# Patient Record
Sex: MALE | Race: WHITE | NOT HISPANIC OR LATINO | URBAN - METROPOLITAN AREA
[De-identification: names, ages, dates, MRNs, and addresses within clinical notes are randomized per-mention and may not be internally consistent; named-entity substitution may affect disease eponyms.]

---

## 2019-04-01 ENCOUNTER — EMERGENCY (EMERGENCY)
Facility: HOSPITAL | Age: 29
LOS: 1 days | End: 2019-04-01
Attending: EMERGENCY MEDICINE
Payer: MEDICAID

## 2019-04-01 VITALS
OXYGEN SATURATION: 99 % | SYSTOLIC BLOOD PRESSURE: 151 MMHG | RESPIRATION RATE: 16 BRPM | DIASTOLIC BLOOD PRESSURE: 84 MMHG | TEMPERATURE: 98 F | HEART RATE: 98 BPM

## 2019-04-01 VITALS — OXYGEN SATURATION: 98 % | HEART RATE: 86 BPM | RESPIRATION RATE: 16 BRPM

## 2019-04-01 LAB
ALBUMIN SERPL ELPH-MCNC: 4.6 G/DL — SIGNIFICANT CHANGE UP (ref 3.3–5)
ALP SERPL-CCNC: 86 U/L — SIGNIFICANT CHANGE UP (ref 40–120)
ALT FLD-CCNC: 31 U/L — SIGNIFICANT CHANGE UP (ref 10–45)
ANION GAP SERPL CALC-SCNC: 15 MMOL/L — SIGNIFICANT CHANGE UP (ref 5–17)
AST SERPL-CCNC: 40 U/L — SIGNIFICANT CHANGE UP (ref 10–40)
BASOPHILS # BLD AUTO: 0.1 K/UL — SIGNIFICANT CHANGE UP (ref 0–0.2)
BASOPHILS NFR BLD AUTO: 0.5 % — SIGNIFICANT CHANGE UP (ref 0–2)
BILIRUB SERPL-MCNC: 0.3 MG/DL — SIGNIFICANT CHANGE UP (ref 0.2–1.2)
BUN SERPL-MCNC: 22 MG/DL — SIGNIFICANT CHANGE UP (ref 7–23)
CALCIUM SERPL-MCNC: 9.5 MG/DL — SIGNIFICANT CHANGE UP (ref 8.4–10.5)
CHLORIDE SERPL-SCNC: 102 MMOL/L — SIGNIFICANT CHANGE UP (ref 96–108)
CO2 SERPL-SCNC: 24 MMOL/L — SIGNIFICANT CHANGE UP (ref 22–31)
CREAT SERPL-MCNC: 1.08 MG/DL — SIGNIFICANT CHANGE UP (ref 0.5–1.3)
EOSINOPHIL # BLD AUTO: 0.3 K/UL — SIGNIFICANT CHANGE UP (ref 0–0.5)
EOSINOPHIL NFR BLD AUTO: 2.8 % — SIGNIFICANT CHANGE UP (ref 0–6)
GLUCOSE SERPL-MCNC: 94 MG/DL — SIGNIFICANT CHANGE UP (ref 70–99)
HCT VFR BLD CALC: 51 % — HIGH (ref 39–50)
HGB BLD-MCNC: 17.5 G/DL — HIGH (ref 13–17)
LYMPHOCYTES # BLD AUTO: 2.5 K/UL — SIGNIFICANT CHANGE UP (ref 1–3.3)
LYMPHOCYTES # BLD AUTO: 24.5 % — SIGNIFICANT CHANGE UP (ref 13–44)
MAGNESIUM SERPL-MCNC: 2.4 MG/DL — SIGNIFICANT CHANGE UP (ref 1.6–2.6)
MCHC RBC-ENTMCNC: 31.3 PG — SIGNIFICANT CHANGE UP (ref 27–34)
MCHC RBC-ENTMCNC: 34.3 GM/DL — SIGNIFICANT CHANGE UP (ref 32–36)
MCV RBC AUTO: 91.3 FL — SIGNIFICANT CHANGE UP (ref 80–100)
MONOCYTES # BLD AUTO: 0.8 K/UL — SIGNIFICANT CHANGE UP (ref 0–0.9)
MONOCYTES NFR BLD AUTO: 8.1 % — SIGNIFICANT CHANGE UP (ref 2–14)
NEUTROPHILS # BLD AUTO: 6.6 K/UL — SIGNIFICANT CHANGE UP (ref 1.8–7.4)
NEUTROPHILS NFR BLD AUTO: 64.1 % — SIGNIFICANT CHANGE UP (ref 43–77)
PHOSPHATE SERPL-MCNC: 3.5 MG/DL — SIGNIFICANT CHANGE UP (ref 2.5–4.5)
PLATELET # BLD AUTO: 219 K/UL — SIGNIFICANT CHANGE UP (ref 150–400)
POTASSIUM SERPL-MCNC: 4.2 MMOL/L — SIGNIFICANT CHANGE UP (ref 3.5–5.3)
POTASSIUM SERPL-SCNC: 4.2 MMOL/L — SIGNIFICANT CHANGE UP (ref 3.5–5.3)
PROT SERPL-MCNC: 8.4 G/DL — HIGH (ref 6–8.3)
RBC # BLD: 5.58 M/UL — SIGNIFICANT CHANGE UP (ref 4.2–5.8)
RBC # FLD: 11.6 % — SIGNIFICANT CHANGE UP (ref 10.3–14.5)
SODIUM SERPL-SCNC: 141 MMOL/L — SIGNIFICANT CHANGE UP (ref 135–145)
TSH SERPL-MCNC: 2.2 UIU/ML — SIGNIFICANT CHANGE UP (ref 0.27–4.2)
WBC # BLD: 10.4 K/UL — SIGNIFICANT CHANGE UP (ref 3.8–10.5)
WBC # FLD AUTO: 10.4 K/UL — SIGNIFICANT CHANGE UP (ref 3.8–10.5)

## 2019-04-01 PROCEDURE — 85027 COMPLETE CBC AUTOMATED: CPT

## 2019-04-01 PROCEDURE — 93005 ELECTROCARDIOGRAM TRACING: CPT

## 2019-04-01 PROCEDURE — 83735 ASSAY OF MAGNESIUM: CPT

## 2019-04-01 PROCEDURE — 99284 EMERGENCY DEPT VISIT MOD MDM: CPT | Mod: 25

## 2019-04-01 PROCEDURE — 84100 ASSAY OF PHOSPHORUS: CPT

## 2019-04-01 PROCEDURE — 93010 ELECTROCARDIOGRAM REPORT: CPT

## 2019-04-01 PROCEDURE — 71046 X-RAY EXAM CHEST 2 VIEWS: CPT

## 2019-04-01 PROCEDURE — 84443 ASSAY THYROID STIM HORMONE: CPT

## 2019-04-01 PROCEDURE — 80053 COMPREHEN METABOLIC PANEL: CPT

## 2019-04-01 PROCEDURE — 71046 X-RAY EXAM CHEST 2 VIEWS: CPT | Mod: 26

## 2019-04-01 NOTE — ED STATDOCS - OBJECTIVE STATEMENT
28yom no pmhx, here with palpitations, pt reports extensive workup in the past with echo, stress test, holter with no significance results. pt reports feeling skipping a beat or missing a beat.

## 2019-04-01 NOTE — ED ADULT NURSE REASSESSMENT NOTE - NS ED NURSE REASSESS COMMENT FT1
Patient discharged from ED. Discharge paperwork provided. Verbalized understanding of teaching. Vital signs stable, afebrile. Patient leaving unit ambulatory, free from harm.

## 2019-04-01 NOTE — ED PROVIDER NOTE - PROGRESS NOTE DETAILS
initial EKG for patient showing PVCs consistent with bigeminy pattern. Now patient is asymptomatic and repeat EKG shows normal sinus rhythm without any PVCs at rate of 78bpm. advised patient on normalization of ekg and need for follow-up with [facility] for longer period of Holter monitoring and reevaluation. Patient states that he can get a follow-up tomorrow with his cardiologist. copies of EKG provided to patient to take to appointment. Pending CXR at this time if normal patient will be stable for d/c for follow-up with cardiology. -Sammie Sweeney PA-C

## 2019-04-01 NOTE — ED ADULT TRIAGE NOTE - CHIEF COMPLAINT QUOTE
hx of palpitations and today when he checked, HR was 45 and having persistent palpitations since 12pm  last stress and echo done was one year ago and was told he has "normal palpitations"

## 2019-04-01 NOTE — ED ADULT NURSE NOTE - OBJECTIVE STATEMENT
pt states onset of palpitations at 11am this morning and lasted until 2:30pm pt presents to CDU area at 1740 from triage area pt states palpitaions has resolved at this time hx of previous palpitations and has had work up pt placed on portable cardiac moniter on arrival

## 2019-04-01 NOTE — ED ADULT NURSE REASSESSMENT NOTE - NS ED NURSE REASSESS COMMENT FT1
Patient taken to Xray. Reports "I feel no symptoms" Denies chest pain, palpitations, abdomen pain, n/v/d. Patient well appearing. a/ox3, will reassess when patient returns.

## 2019-04-01 NOTE — ED PROVIDER NOTE - CLINICAL SUMMARY MEDICAL DECISION MAKING FREE TEXT BOX
29 y/o M pmhx palpitations chronically, always lasting a few seconds to a few minutes, presenting to ED today with episode that lasted from 1130AM to 3PM concerning him with how long they lasted. No associated symptoms. Palpitations resolved upon evaluation. Labs resulted showing mild hemoconcentration otherwise normal. Initial EKG in triage revealing PVCs with bigeminy, now asymptomatic with EKG NSR at 78bpm. Lengthy work-up obtained as outpatient with normal TTE, stress test and echo in past. Will obtain CXR and discuss need for repeat Holter monitoring with cardiologist for further evaluation and strict return precautions. 27 y/o M pmhx palpitations chronically, always lasting a few seconds to a few minutes, presenting to ED today with episode that lasted from 1130AM to 3PM concerning him with how long they lasted. No associated symptoms. Palpitations resolved upon evaluation. Labs resulted showing mild hemoconcentration otherwise normal. Initial EKG in triage revealing PVCs with bigeminy, now asymptomatic with EKG NSR at 78bpm. Lengthy work-up obtained as outpatient with normal TTE, stress test and echo in past. Will obtain CXR and discuss need for repeat Holter monitoring with cardiologist for further evaluation and strict return precautions.  Andres: Patient with palpitations on and off in the past, today with palpitations for longer period of time. upon arrival in ER, EKG showed bigemny, symptoms resolved and now with NSR on ekg. labs and electrolytes wnl. no symptoms at this time. has cards f/u in am. no cp, no sob, no dizziness. no change in diet, no excess caffeine. will add on tsh, cxr. d/c home to f/u.

## 2019-04-01 NOTE — ED PROVIDER NOTE - OBJECTIVE STATEMENT
29 y/o M pmhx palpitations throughout his life, presenting with episode of palpitations today lasting >3 hours. Pt states that in the past his palpitations have always only lasted a few minutes or seconds, and today when they lasted for several hours he became concerned. States that he has had a workup approximately 2 years ago for his palpitations, consisting of an echo, a stress test and an EKG that were all within normal limits. He states that he had a holter monitor as well which did not show any abnormalities but he does not know if he wore it for more than a day. He states that his palpitations started approximately 1130AM and lasted until approximately 3PM. Reports he was still experiencing them when he was in the waiting room but reports they have been resolved for approximately 2 hours at this time. Denies any symptoms of chest pain, shortness of breath, nausea, vomiting, sweating, lightheadedness, dizziness with his palpitations. Denies any recent illnesses, travel, increased or any caffeine intake, supplements or other changes to his baseline intake.   Cardiologist: Karolina

## 2019-04-01 NOTE — ED PROVIDER NOTE - NS ED ROS FT
Constitutional: No fever or chills  Eyes: No visual changes, eye pain or redness  HEENT: No throat pain, ear pain, nasal pain. No nose bleeding.  CV: No chest pain or lower extremity edema. +palpitations, resolved.   Resp: No SOB no cough  GI: No abd pain. No nausea or vomiting. No diarrhea. No constipation.   : No dysuria, hematuria.   MSK: No musculoskeletal pain  Skin: No rash  Neuro: No headache. No numbness or tingling. No weakness.

## 2019-04-01 NOTE — ED PROVIDER NOTE - NSFOLLOWUPINSTRUCTIONS_ED_ALL_ED_FT
1. Follow-up with your cardiologist tomorrow for continued evaluation and to start Holter monitoring for longer period. Bring copies of EKGs provided to you   2. Hydrate yourself well throughout the day   3. Return to the ER for any new or worsening symptoms

## 2019-12-05 NOTE — ED ADULT NURSE NOTE - PRO INTERPRETER NEED 2
English
[FreeTextEntry1] : patient likely has UTI, will start antibiotics prophylactically and will adjust as needed when urine culture results return \par \par In regards to patient's back pain with urinary symptoms, will order US renal and call patient with results. \par \par Counseling included abnormal lab results, differential diagnoses, treatment options, risks and benefits, lifestyle changes, prognosis, current condition, medications, and dose adjustments. \par The patient was interactive, attentive, asked questions, and verbalized understanding

## 2020-01-14 PROBLEM — Z78.9 OTHER SPECIFIED HEALTH STATUS: Chronic | Status: ACTIVE | Noted: 2019-04-01

## 2020-01-15 DIAGNOSIS — L05.91 PILONIDAL CYST W/OUT ABSCESS: ICD-10-CM

## 2020-01-16 ENCOUNTER — APPOINTMENT (OUTPATIENT)
Dept: SURGERY | Facility: CLINIC | Age: 30
End: 2020-01-16

## 2020-01-23 ENCOUNTER — APPOINTMENT (OUTPATIENT)
Dept: SURGERY | Facility: CLINIC | Age: 30
End: 2020-01-23
Payer: MEDICAID

## 2020-01-23 ENCOUNTER — APPOINTMENT (OUTPATIENT)
Dept: SURGERY | Facility: CLINIC | Age: 30
End: 2020-01-23

## 2020-01-23 VITALS
HEIGHT: 74 IN | BODY MASS INDEX: 28.49 KG/M2 | SYSTOLIC BLOOD PRESSURE: 132 MMHG | HEART RATE: 90 BPM | RESPIRATION RATE: 16 BRPM | OXYGEN SATURATION: 98 % | WEIGHT: 222 LBS | TEMPERATURE: 99.3 F | DIASTOLIC BLOOD PRESSURE: 81 MMHG

## 2020-01-23 VITALS
RESPIRATION RATE: 16 BRPM | DIASTOLIC BLOOD PRESSURE: 81 MMHG | HEART RATE: 90 BPM | BODY MASS INDEX: 28.49 KG/M2 | WEIGHT: 222 LBS | TEMPERATURE: 99.3 F | HEIGHT: 74 IN | SYSTOLIC BLOOD PRESSURE: 132 MMHG | OXYGEN SATURATION: 98 %

## 2020-01-23 DIAGNOSIS — Z98.890 OTHER SPECIFIED POSTPROCEDURAL STATES: ICD-10-CM

## 2020-01-23 DIAGNOSIS — K60.3 ANAL FISTULA: ICD-10-CM

## 2020-01-23 DIAGNOSIS — Z78.9 OTHER SPECIFIED HEALTH STATUS: ICD-10-CM

## 2020-01-23 PROCEDURE — 99203 OFFICE O/P NEW LOW 30 MIN: CPT | Mod: 25

## 2020-01-23 PROCEDURE — 46600 DIAGNOSTIC ANOSCOPY SPX: CPT

## 2020-01-23 NOTE — ASSESSMENT
[FreeTextEntry1] : I have seen and evaluated patient and I have corroborated all nursing input into this note. Although patient has multiple pilonidal cysts in the intergluteal cleft, his open draining wound appears to extend towards the posterior anal canal. 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 I recommended an MRI to further define the tract. Pending the MRI I will most likely proceed with colonoscopy to rule out inflammatory bowel disease. Indications, risks, benefits, alternatives reviewed including but not limited to bleeding, perforation, and incomplete exam. If a deep transsphincteric fistula is identified and there is no evidence of Crohn's disease then a staged sphincter sparing procedure will be needed to treat the fistula. Unfortunately, there is a trade off between continence and success rate with surgery for deep transsphincteric anal fistulas. I informed the patient that the sphincter sparing procedures are generally associated with a 70-80% success rate and a 5% rate of incontinence of flatus and stool staining.

## 2020-01-23 NOTE — HISTORY OF PRESENT ILLNESS
[FreeTextEntry1] : The patient reports recurrent drainage and pain near his tailbone that started one year ago. He has had it incised and drained by his dermatologist multiple times. He reports normal formed BMs daily. Denies abdominal cramping and/or diarrhea.

## 2020-01-23 NOTE — PHYSICAL EXAM
[Normal Breath Sounds] : Normal breath sounds [Normal Heart Sounds] : normal heart sounds [Normal Rate and Rhythm] : normal rate and rhythm [No Edema] : No edema [No Rash or Lesion] : No rash or lesion [Alert] : alert [Oriented to Person] : oriented to person [Oriented to Place] : oriented to place [Oriented to Time] : oriented to time [Calm] : calm [Abdomen Masses] : No abdominal masses [Abdomen Tenderness] : ~T No ~M abdominal tenderness [JVD] : no jugular venous distention  [Wheezing] : no wheezing was heard [de-identified] : Appears well nourished [de-identified] : Full ROM [de-identified] : WNL [FreeTextEntry1] : Perianal inspection demonstrated multiple pilonidal cyst openings in the intergluteal cleft. There was an open draining wound over the left posterior ischiorectal fossa. Probing of the wound extended toward the anus. There was no palpable superficial fistula tract. Digital and anoscopic evaluation unremarkable.

## 2020-01-23 NOTE — CONSULT LETTER
[Dear  ___] : Dear ~JEAN-CLAUDE, [Courtesy Letter:] : I had the pleasure of seeing your patient, [unfilled], in my office today. [Please see my note below.] : Please see my note below. [Consult Closing:] : Thank you very much for allowing me to participate in the care of this patient.  If you have any questions, please do not hesitate to contact me. [Sincerely,] : Sincerely, [DrRitesh  ___] : Dr. PEDERSEN [FreeTextEntry2] : Dr. Marko Martins [FreeTextEntry3] : Rob Walden M.D., JERRY.GENNA., F.BOUCHRA.S.PITERRRiteshS.\[facility] Chief Colorectal Clinical Services, [facility]

## 2020-01-26 ENCOUNTER — FORM ENCOUNTER (OUTPATIENT)
Age: 30
End: 2020-01-26

## 2020-01-27 ENCOUNTER — APPOINTMENT (OUTPATIENT)
Dept: MRI IMAGING | Facility: IMAGING CENTER | Age: 30
End: 2020-01-27
Payer: MEDICAID

## 2020-01-27 ENCOUNTER — OUTPATIENT (OUTPATIENT)
Dept: OUTPATIENT SERVICES | Facility: HOSPITAL | Age: 30
LOS: 1 days | End: 2020-01-27
Payer: MEDICAID

## 2020-01-27 DIAGNOSIS — K60.3 ANAL FISTULA: ICD-10-CM

## 2020-01-27 PROCEDURE — A9585: CPT

## 2020-01-27 PROCEDURE — 72197 MRI PELVIS W/O & W/DYE: CPT

## 2020-01-27 PROCEDURE — 72197 MRI PELVIS W/O & W/DYE: CPT | Mod: 26

## 2020-12-28 NOTE — ED ADULT NURSE NOTE - CAS TRG GEN SKIN CONDITION
Detail Level: Generalized Warm Action 2: Continue Sig For Treatment 1 (If Needed): twice daily Action 1: Start Treatment 1: Betamethasone diproprionate ointment

## 2022-04-02 ENCOUNTER — EMERGENCY (EMERGENCY)
Facility: HOSPITAL | Age: 32
LOS: 1 days | Discharge: HOME | End: 2022-04-02
Attending: EMERGENCY MEDICINE
Payer: COMMERCIAL

## 2022-04-02 VITALS
HEART RATE: 115 BPM | DIASTOLIC BLOOD PRESSURE: 69 MMHG | TEMPERATURE: 98 F | SYSTOLIC BLOOD PRESSURE: 110 MMHG | OXYGEN SATURATION: 100 % | WEIGHT: 205.03 LBS | HEIGHT: 74 IN | RESPIRATION RATE: 26 BRPM

## 2022-04-02 VITALS
OXYGEN SATURATION: 97 % | SYSTOLIC BLOOD PRESSURE: 118 MMHG | RESPIRATION RATE: 20 BRPM | HEART RATE: 106 BPM | TEMPERATURE: 101 F | DIASTOLIC BLOOD PRESSURE: 64 MMHG

## 2022-04-02 LAB
ALBUMIN SERPL ELPH-MCNC: 4.7 G/DL — SIGNIFICANT CHANGE UP (ref 3.3–5)
ALP SERPL-CCNC: 78 U/L — SIGNIFICANT CHANGE UP (ref 40–120)
ALT FLD-CCNC: 35 U/L — SIGNIFICANT CHANGE UP (ref 10–45)
ANION GAP SERPL CALC-SCNC: 13 MMOL/L — SIGNIFICANT CHANGE UP (ref 5–17)
AST SERPL-CCNC: 40 U/L — SIGNIFICANT CHANGE UP (ref 10–40)
BASOPHILS # BLD AUTO: 0 K/UL — SIGNIFICANT CHANGE UP (ref 0–0.2)
BASOPHILS NFR BLD AUTO: 0 % — SIGNIFICANT CHANGE UP (ref 0–2)
BILIRUB SERPL-MCNC: 0.7 MG/DL — SIGNIFICANT CHANGE UP (ref 0.2–1.2)
BUN SERPL-MCNC: 25 MG/DL — HIGH (ref 7–23)
CALCIUM SERPL-MCNC: 9.5 MG/DL — SIGNIFICANT CHANGE UP (ref 8.4–10.5)
CHLORIDE SERPL-SCNC: 100 MMOL/L — SIGNIFICANT CHANGE UP (ref 96–108)
CO2 SERPL-SCNC: 25 MMOL/L — SIGNIFICANT CHANGE UP (ref 22–31)
CREAT SERPL-MCNC: 1.19 MG/DL — SIGNIFICANT CHANGE UP (ref 0.5–1.3)
EGFR: 83 ML/MIN/1.73M2 — SIGNIFICANT CHANGE UP
EOSINOPHIL # BLD AUTO: 0.16 K/UL — SIGNIFICANT CHANGE UP (ref 0–0.5)
EOSINOPHIL NFR BLD AUTO: 1.7 % — SIGNIFICANT CHANGE UP (ref 0–6)
GLUCOSE SERPL-MCNC: 133 MG/DL — HIGH (ref 70–99)
HCT VFR BLD CALC: 52.9 % — HIGH (ref 39–50)
HGB BLD-MCNC: 18.5 G/DL — HIGH (ref 13–17)
LIDOCAIN IGE QN: 61 U/L — HIGH (ref 7–60)
LYMPHOCYTES # BLD AUTO: 0.25 K/UL — LOW (ref 1–3.3)
LYMPHOCYTES # BLD AUTO: 2.6 % — LOW (ref 13–44)
MANUAL SMEAR VERIFICATION: SIGNIFICANT CHANGE UP
MCHC RBC-ENTMCNC: 29.8 PG — SIGNIFICANT CHANGE UP (ref 27–34)
MCHC RBC-ENTMCNC: 35 GM/DL — SIGNIFICANT CHANGE UP (ref 32–36)
MCV RBC AUTO: 85.3 FL — SIGNIFICANT CHANGE UP (ref 80–100)
MONOCYTES # BLD AUTO: 0.99 K/UL — HIGH (ref 0–0.9)
MONOCYTES NFR BLD AUTO: 10.2 % — SIGNIFICANT CHANGE UP (ref 2–14)
NEUTROPHILS # BLD AUTO: 8.05 K/UL — HIGH (ref 1.8–7.4)
NEUTROPHILS NFR BLD AUTO: 75.4 % — SIGNIFICANT CHANGE UP (ref 43–77)
NEUTS BAND # BLD: 7.6 % — SIGNIFICANT CHANGE UP (ref 0–8)
PLAT MORPH BLD: NORMAL — SIGNIFICANT CHANGE UP
PLATELET # BLD AUTO: 165 K/UL — SIGNIFICANT CHANGE UP (ref 150–400)
POTASSIUM SERPL-MCNC: 4.1 MMOL/L — SIGNIFICANT CHANGE UP (ref 3.5–5.3)
POTASSIUM SERPL-SCNC: 4.1 MMOL/L — SIGNIFICANT CHANGE UP (ref 3.5–5.3)
PROT SERPL-MCNC: 8 G/DL — SIGNIFICANT CHANGE UP (ref 6–8.3)
RBC # BLD: 6.2 M/UL — HIGH (ref 4.2–5.8)
RBC # FLD: 11.9 % — SIGNIFICANT CHANGE UP (ref 10.3–14.5)
RBC BLD AUTO: NORMAL — SIGNIFICANT CHANGE UP
SODIUM SERPL-SCNC: 138 MMOL/L — SIGNIFICANT CHANGE UP (ref 135–145)
VARIANT LYMPHS # BLD: 2.5 % — SIGNIFICANT CHANGE UP (ref 0–6)
WBC # BLD: 9.7 K/UL — SIGNIFICANT CHANGE UP (ref 3.8–10.5)
WBC # FLD AUTO: 9.7 K/UL — SIGNIFICANT CHANGE UP (ref 3.8–10.5)

## 2022-04-02 PROCEDURE — 99284 EMERGENCY DEPT VISIT MOD MDM: CPT

## 2022-04-02 RX ORDER — ONDANSETRON 8 MG/1
4 TABLET, FILM COATED ORAL ONCE
Refills: 0 | Status: COMPLETED | OUTPATIENT
Start: 2022-04-02 | End: 2022-04-02

## 2022-04-02 RX ORDER — ACETAMINOPHEN 500 MG
650 TABLET ORAL ONCE
Refills: 0 | Status: COMPLETED | OUTPATIENT
Start: 2022-04-02 | End: 2022-04-02

## 2022-04-02 RX ORDER — SODIUM CHLORIDE 9 MG/ML
1000 INJECTION INTRAMUSCULAR; INTRAVENOUS; SUBCUTANEOUS ONCE
Refills: 0 | Status: COMPLETED | OUTPATIENT
Start: 2022-04-02 | End: 2022-04-02

## 2022-04-02 RX ORDER — FAMOTIDINE 10 MG/ML
20 INJECTION INTRAVENOUS ONCE
Refills: 0 | Status: COMPLETED | OUTPATIENT
Start: 2022-04-02 | End: 2022-04-02

## 2022-04-02 RX ORDER — ONDANSETRON 8 MG/1
1 TABLET, FILM COATED ORAL
Qty: 9 | Refills: 0
Start: 2022-04-02 | End: 2022-04-04

## 2022-04-02 RX ADMIN — ONDANSETRON 4 MILLIGRAM(S): 8 TABLET, FILM COATED ORAL at 21:40

## 2022-04-02 RX ADMIN — SODIUM CHLORIDE 1000 MILLILITER(S): 9 INJECTION INTRAMUSCULAR; INTRAVENOUS; SUBCUTANEOUS at 23:15

## 2022-04-02 RX ADMIN — Medication 650 MILLIGRAM(S): at 23:15

## 2022-04-02 RX ADMIN — FAMOTIDINE 20 MILLIGRAM(S): 10 INJECTION INTRAVENOUS at 21:40

## 2022-04-02 RX ADMIN — SODIUM CHLORIDE 1000 MILLILITER(S): 9 INJECTION INTRAMUSCULAR; INTRAVENOUS; SUBCUTANEOUS at 21:40

## 2022-04-02 NOTE — ED PROVIDER NOTE - NSFOLLOWUPCLINICS_GEN_ALL_ED_FT
[facility] - Primary Care  Primary Care  [address] [place] [address]  Phone: [phone]  Fax:

## 2022-04-02 NOTE — ED PROVIDER NOTE - OBJECTIVE STATEMENT
32 y/o M no PMH presents to ED c/o multiple episodes nonbloody vomiting onset today 2pm today - reports his kids are sick at home w/ same symptoms. Denies f/c. pt also reports some rib fractures from 6 weeks ago s/p dropping a weight on his chest. 32 y/o M no PMH presents to ED c/o multiple episodes nonbloody vomiting onset today 2pm today - reports his kids are sick at home w/ same symptoms. reports some chills today, denies testicular pain, dysuria, hematuria, diarrhea. LBM today. pt also reports he has some rib fractures from 6 weeks ago s/p dropping a weight on his chest.

## 2022-04-02 NOTE — ED ADULT NURSE NOTE - SUICIDE SCREENING QUESTION 2
PALLIATIVE MEDICINE      Official note to follow    Met with the pt and her spouse. Explained our service and the purpose of the consultation. Family requesting to speak with oncology before meeting with our team again. Discussed the request with Shilpa Torres NP who will follow up with the spouse. Thank you. Cristal Law.  [address] MSN, FNP-BC, [facility] No

## 2022-04-02 NOTE — ED PROVIDER NOTE - PHYSICAL EXAMINATION
Gen: well developed male NAD   HEENT: NCAT, EOMI, no nasal discharge, mucous membranes moist  CV: RRR, +S1/S2, no M/R/G  Resp: CTAB, no W/R/R  GI: Abdomen soft non-distended, NTTP, no masses  MSK: No open wounds, no bruising, no LE edema  Neuro: A&Ox4, following commands, moving all four extremities spontaneously  Psych: appropriate mood

## 2022-04-02 NOTE — ED ADULT NURSE NOTE - OBJECTIVE STATEMENT
31 y male presents from home with n/v and abdominal discomfort a/w chills. reports to children at home with similar symptoms. reports to decreased po intake, lightheadedness, dizziness and chills. denies localized pain, diarrhea, cough, sob, cp. Hr elevated- MD Roberts aware; awaiting labs and dispo. bed locked in lowest position.

## 2022-04-02 NOTE — ED PROVIDER NOTE - NSFOLLOWUPINSTRUCTIONS_ED_ALL_ED_FT
Rest and stay well hydrated.  Follow-up with your PMD in 3-5 days for recheck.   Return to the ED for any worsening vomiting, blood in vomit, abdominal pain, difficulty breathing, chest pain or any new concerning symptoms. Rest and stay well hydrated.  Follow-up with your PMD in 3-5 days for recheck.   Take zofran as needed as prescribed for nausea and vomiting.   Return to the ED for any worsening vomiting, blood in vomit, abdominal pain, difficulty breathing, chest pain or any new concerning symptoms.

## 2022-04-02 NOTE — ED PROVIDER NOTE - PROGRESS NOTE DETAILS
pt was reassessed, states feels improved + tolerating PO in the ED. Dr. Roberts discussed results with patient, plan for f/u w/ pmd and take zofran as prescribed . pt verbalized understanding, agrees with plan, all questions answered. Patient is reassessed, states feeling much better at this time. Noted to be slightly tachycardic still, treated for fever with antipyretics, pt states he feels much better and tolerated PO and would like to go home, return precautions discussed. RGUJRAL

## 2022-04-02 NOTE — ED PROVIDER NOTE - ATTENDING CONTRIBUTION TO CARE
RGUJRAL 30yo male no pmhx present with multiple episodes of n/v x 1 d. +sick contact with kids at home. Complains of abdominal cramps, no diarrhea. On exam, Patient is awake,alert,oriented x 3. Patient is in no acute distress. MM Dry. Patient's chest is clear to ausculation, +s1s2. Abdomen is soft nd/nt +BS. Extremity with no swelling or calf tenderness.   Supportive care, ddx enteritis with IVF, re eval.

## 2022-04-02 NOTE — ED PROVIDER NOTE - CLINICAL SUMMARY MEDICAL DECISION MAKING FREE TEXT BOX
32 y/o M no PMH presents to ED c/o multiple episodes nonbloody vomiting onset today 2pm today - reports his kids are sick at home w/ same symptoms. Denies f/c. concern for viral illness, gastroenteritis, low suspicion appy, pancreatitis. plan labs IVF nausea control reassess 30 y/o M no PMH presents to ED c/o multiple episodes nonbloody vomiting onset today 2pm today - reports his kids are sick at home w/ same symptoms. reports some chills today, denies testicular pain, dysuria, hematuria, diarrhea. LBM today. concern for viral illness, gastroenteritis, low suspicion appy, pancreatitis. plan labs IVF nausea control reassess

## 2022-04-02 NOTE — ED PROVIDER NOTE - PATIENT PORTAL LINK FT
You can access the FollowMyHealth Patient Portal offered by [facility] by registering at the following website: [email or web address]. By joining [employer]’s FollowMyHealth portal, you will also be able to view your health information using other applications (apps) compatible with our system. You can access the FollowMyHealth Patient Portal offered by [facility] by registering at the following website: [email or web address]. By joining [employer]’s FollowMyHealth portal, you will also be able to view your health information using other applications (apps) compatible with our system.

## 2022-04-02 NOTE — ED ADULT TRIAGE NOTE - CHIEF COMPLAINT QUOTE
Pt states that he has had nausea and vomiting since 1400 today. Pt states that his family has had a stomach bug.

## 2022-04-03 PROCEDURE — 36415 COLL VENOUS BLD VENIPUNCTURE: CPT

## 2022-04-03 PROCEDURE — 85025 COMPLETE CBC W/AUTO DIFF WBC: CPT

## 2022-04-03 PROCEDURE — 96375 TX/PRO/DX INJ NEW DRUG ADDON: CPT

## 2022-04-03 PROCEDURE — 80053 COMPREHEN METABOLIC PANEL: CPT

## 2022-04-03 PROCEDURE — 93005 ELECTROCARDIOGRAM TRACING: CPT

## 2022-04-03 PROCEDURE — 83690 ASSAY OF LIPASE: CPT

## 2022-04-03 PROCEDURE — 99284 EMERGENCY DEPT VISIT MOD MDM: CPT | Mod: 25

## 2022-04-03 PROCEDURE — 96374 THER/PROPH/DIAG INJ IV PUSH: CPT

## 2022-04-03 RX ORDER — ONDANSETRON 8 MG/1
1 TABLET, FILM COATED ORAL
Qty: 9 | Refills: 0
Start: 2022-04-03 | End: 2022-04-05
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
